# Patient Record
Sex: FEMALE | URBAN - METROPOLITAN AREA
[De-identification: names, ages, dates, MRNs, and addresses within clinical notes are randomized per-mention and may not be internally consistent; named-entity substitution may affect disease eponyms.]

---

## 2021-07-13 ENCOUNTER — APPOINTMENT (OUTPATIENT)
Dept: URBAN - METROPOLITAN AREA SURGERY 8 | Age: 41
Setting detail: DERMATOLOGY
End: 2021-07-13

## 2021-07-13 DIAGNOSIS — L73.8 OTHER SPECIFIED FOLLICULAR DISORDERS: ICD-10-CM

## 2021-07-13 DIAGNOSIS — D485 NEOPLASM OF UNCERTAIN BEHAVIOR OF SKIN: ICD-10-CM

## 2021-07-13 PROBLEM — D23.39 OTHER BENIGN NEOPLASM OF SKIN OF OTHER PARTS OF FACE: Status: ACTIVE | Noted: 2021-07-13

## 2021-07-13 PROBLEM — D48.5 NEOPLASM OF UNCERTAIN BEHAVIOR OF SKIN: Status: ACTIVE | Noted: 2021-07-13

## 2021-07-13 PROCEDURE — OTHER TREATMENT REGIMEN: OTHER

## 2021-07-13 PROCEDURE — 99213 OFFICE O/P EST LOW 20 MIN: CPT | Mod: 25

## 2021-07-13 PROCEDURE — 11102 TANGNTL BX SKIN SINGLE LES: CPT

## 2021-07-13 PROCEDURE — OTHER MIPS QUALITY: OTHER

## 2021-07-13 PROCEDURE — OTHER BIOPSY BY SHAVE METHOD: OTHER

## 2021-07-13 PROCEDURE — OTHER COUNSELING: OTHER

## 2021-07-13 PROCEDURE — OTHER PRESCRIPTION: OTHER

## 2021-07-13 RX ORDER — TRETIONIN 0.25 MG/G
CREAM TOPICAL
Qty: 1 | Refills: 2 | Status: ERX | COMMUNITY
Start: 2021-07-13

## 2021-07-13 ASSESSMENT — LOCATION DETAILED DESCRIPTION DERM
LOCATION DETAILED: LEFT INFERIOR MEDIAL FOREHEAD
LOCATION DETAILED: LEFT DISTAL PRETIBIAL REGION

## 2021-07-13 ASSESSMENT — LOCATION SIMPLE DESCRIPTION DERM
LOCATION SIMPLE: LEFT PRETIBIAL REGION
LOCATION SIMPLE: LEFT FOREHEAD

## 2021-07-13 ASSESSMENT — LOCATION ZONE DERM
LOCATION ZONE: LEG
LOCATION ZONE: FACE

## 2021-07-13 NOTE — PROCEDURE: TREATMENT REGIMEN
Initiate Treatment: Tretinoin 0.025% cream qhs (if not covered or too expensive recommended Differin or Effaclar- samples given)\\nSalicylic acid face wash
Detail Level: Simple
Plan: History of untreated hepatitis C so will avoid electrodessication due to risk of viral particles in flume
